# Patient Record
Sex: MALE | Race: WHITE | Employment: FULL TIME | URBAN - METROPOLITAN AREA
[De-identification: names, ages, dates, MRNs, and addresses within clinical notes are randomized per-mention and may not be internally consistent; named-entity substitution may affect disease eponyms.]

---

## 2019-05-20 ENCOUNTER — OFFICE VISIT (OUTPATIENT)
Dept: FAMILY MEDICINE CLINIC | Age: 39
End: 2019-05-20
Payer: COMMERCIAL

## 2019-05-20 VITALS
WEIGHT: 163.8 LBS | BODY MASS INDEX: 26.33 KG/M2 | OXYGEN SATURATION: 98 % | HEIGHT: 66 IN | DIASTOLIC BLOOD PRESSURE: 88 MMHG | SYSTOLIC BLOOD PRESSURE: 134 MMHG | HEART RATE: 84 BPM

## 2019-05-20 DIAGNOSIS — F17.200 TOBACCO DEPENDENCE: ICD-10-CM

## 2019-05-20 DIAGNOSIS — M54.41 ACUTE RIGHT-SIDED LOW BACK PAIN WITH RIGHT-SIDED SCIATICA: Primary | ICD-10-CM

## 2019-05-20 PROCEDURE — 99202 OFFICE O/P NEW SF 15 MIN: CPT | Performed by: NURSE PRACTITIONER

## 2019-05-20 RX ORDER — METHYLPREDNISOLONE 4 MG/1
TABLET ORAL
Qty: 1 KIT | Refills: 0 | Status: SHIPPED | OUTPATIENT
Start: 2019-05-20 | End: 2019-05-26

## 2019-05-20 RX ORDER — AMLODIPINE BESYLATE 10 MG/1
10 TABLET ORAL
COMMUNITY
Start: 2019-04-29

## 2019-05-20 RX ORDER — CYCLOBENZAPRINE HCL 5 MG
5 TABLET ORAL 3 TIMES DAILY PRN
Qty: 20 TABLET | Refills: 0 | Status: SHIPPED | OUTPATIENT
Start: 2019-05-20

## 2019-05-20 RX ORDER — LISINOPRIL 40 MG/1
40 TABLET ORAL
COMMUNITY
Start: 2019-04-29

## 2019-05-20 RX ORDER — NAPROXEN 500 MG/1
500 TABLET ORAL 2 TIMES DAILY WITH MEALS
Qty: 30 TABLET | Refills: 0 | Status: SHIPPED | OUTPATIENT
Start: 2019-05-20 | End: 2019-06-05 | Stop reason: SDUPTHER

## 2019-05-20 SDOH — HEALTH STABILITY: MENTAL HEALTH: HOW MANY STANDARD DRINKS CONTAINING ALCOHOL DO YOU HAVE ON A TYPICAL DAY?: 1 OR 2

## 2019-05-20 SDOH — HEALTH STABILITY: MENTAL HEALTH: HOW OFTEN DO YOU HAVE A DRINK CONTAINING ALCOHOL?: 4 OR MORE TIMES A WEEK

## 2019-05-20 ASSESSMENT — PATIENT HEALTH QUESTIONNAIRE - PHQ9
2. FEELING DOWN, DEPRESSED OR HOPELESS: 0
SUM OF ALL RESPONSES TO PHQ QUESTIONS 1-9: 0
1. LITTLE INTEREST OR PLEASURE IN DOING THINGS: 0
SUM OF ALL RESPONSES TO PHQ QUESTIONS 1-9: 0
SUM OF ALL RESPONSES TO PHQ9 QUESTIONS 1 & 2: 0

## 2019-05-20 ASSESSMENT — ENCOUNTER SYMPTOMS
BOWEL INCONTINENCE: 0
BACK PAIN: 1
ABDOMINAL PAIN: 0

## 2019-05-20 NOTE — PATIENT INSTRUCTIONS
Patient Education        Low Back Pain: Exercises  Your Care Instructions  Here are some examples of typical rehabilitation exercises for your condition. Start each exercise slowly. Ease off the exercise if you start to have pain. Your doctor or physical therapist will tell you when you can start these exercises and which ones will work best for you. How to do the exercises  Press-up    1. Lie on your stomach, supporting your body with your forearms. 2. Press your elbows down into the floor to raise your upper back. As you do this, relax your stomach muscles and allow your back to arch without using your back muscles. As your press up, do not let your hips or pelvis come off the floor. 3. Hold for 15 to 30 seconds, then relax. 4. Repeat 2 to 4 times. Alternate arm and leg (bird dog) exercise    1. Start on the floor, on your hands and knees. 2. Tighten your belly muscles. 3. Raise one leg off the floor, and hold it straight out behind you. Be careful not to let your hip drop down, because that will twist your trunk. 4. Hold for about 6 seconds, then lower your leg and switch to the other leg. 5. Repeat 8 to 12 times on each leg. 6. Over time, work up to holding for 10 to 30 seconds each time. 7. If you feel stable and secure with your leg raised, try raising the opposite arm straight out in front of you at the same time. Knee-to-chest exercise    1. Lie on your back with your knees bent and your feet flat on the floor. 2. Bring one knee to your chest, keeping the other foot flat on the floor (or keeping the other leg straight, whichever feels better on your lower back). 3. Keep your lower back pressed to the floor. Hold for at least 15 to 30 seconds. 4. Relax, and lower the knee to the starting position. 5. Repeat with the other leg. Repeat 2 to 4 times with each leg. 6. To get more stretch, put your other leg flat on the floor while pulling your knee to your chest.    Curl-ups    1.  Lie on the floor on your back with your knees bent at a 90-degree angle. Your feet should be flat on the floor, about 12 inches from your buttocks. 2. Cross your arms over your chest. If this bothers your neck, try putting your hands behind your neck (not your head), with your elbows spread apart. 3. Slowly tighten your belly muscles and raise your shoulder blades off the floor. 4. Keep your head in line with your body, and do not press your chin to your chest.  5. Hold this position for 1 or 2 seconds, then slowly lower yourself back down to the floor. 6. Repeat 8 to 12 times. Pelvic tilt exercise    1. Lie on your back with your knees bent. 2. \"Brace\" your stomach. This means to tighten your muscles by pulling in and imagining your belly button moving toward your spine. You should feel like your back is pressing to the floor and your hips and pelvis are rocking back. 3. Hold for about 6 seconds while you breathe smoothly. 4. Repeat 8 to 12 times. Heel dig bridging    1. Lie on your back with both knees bent and your ankles bent so that only your heels are digging into the floor. Your knees should be bent about 90 degrees. 2. Then push your heels into the floor, squeeze your buttocks, and lift your hips off the floor until your shoulders, hips, and knees are all in a straight line. 3. Hold for about 6 seconds as you continue to breathe normally, and then slowly lower your hips back down to the floor and rest for up to 10 seconds. 4. Do 8 to 12 repetitions. Hamstring stretch in doorway    1. Lie on your back in a doorway, with one leg through the open door. 2. Slide your leg up the wall to straighten your knee. You should feel a gentle stretch down the back of your leg. 3. Hold the stretch for at least 15 to 30 seconds. Do not arch your back, point your toes, or bend either knee. Keep one heel touching the floor and the other heel touching the wall. 4. Repeat with your other leg.   5. Do 2 to 4 times for each leg. Hip flexor stretch    1. Kneel on the floor with one knee bent and one leg behind you. Place your forward knee over your foot. Keep your other knee touching the floor. 2. Slowly push your hips forward until you feel a stretch in the upper thigh of your rear leg. 3. Hold the stretch for at least 15 to 30 seconds. Repeat with your other leg. 4. Do 2 to 4 times on each side. Wall sit    1. Stand with your back 10 to 12 inches away from a wall. 2. Lean into the wall until your back is flat against it. 3. Slowly slide down until your knees are slightly bent, pressing your lower back into the wall. 4. Hold for about 6 seconds, then slide back up the wall. 5. Repeat 8 to 12 times. Follow-up care is a key part of your treatment and safety. Be sure to make and go to all appointments, and call your doctor if you are having problems. It's also a good idea to know your test results and keep a list of the medicines you take. Where can you learn more? Go to https://[employer]List.[employer]. org and sign in to your [employer] account. Enter K862 in the [employer] box to learn more about \"Low Back Pain: Exercises. \"     If you do not have an account, please click on the \"Sign Up Now\" link. Current as of: September 20, 2018  Content Version: 12.0  © 9718-8130 Healthwise, Incorporated. Care instructions adapted under license by [facility] ([facility]). If you have questions about a medical condition or this instruction, always ask your healthcare professional. [address] any warranty or liability for your use of this information. We are committed to providing you the best care possible. If you receive a survey after visiting one of our offices, please take time to share your experience concerning your physician office visit. These surveys are confidential and no health information about you is shared.     We are eager to improve for you and we are counting on your feedback to help make that happen. We are committed to providing you the best care possible. If you receive a survey after visiting one of our offices, please take time to share your experience concerning your physician office visit. These surveys are confidential and no health information about you is shared. We are eager to improve for you and we are counting on your feedback to help make that happen.

## 2019-05-20 NOTE — PROGRESS NOTES
Camille Albert   34 y.o.  male  [id number]      Chief Complaint   Patient presents with    Back Pain     x 3 weeks, radiating down the right leg. The pain is constant, worse with activity. He went to the chiropractor, he did take ibuprofen once and it didn't help. He denies injury, states he woke up with it. Subjective:  38 y.o.male is here for a follow up. He has the following chronic/acute medical problems: There is no problem list on file for this patient. Back Pain   This is a new problem. The current episode started 1 to 4 weeks ago (3 weeks ). The problem occurs constantly. The problem is unchanged. The pain is present in the lumbar spine. The pain radiates to the right thigh. The pain is at a severity of 1/10 (by the end of the day states it will be a 10). Worse during: worse towards the end of the day. Exacerbated by: movement. Pertinent negatives include no abdominal pain, bladder incontinence, bowel incontinence, chest pain, dysuria, fever, headaches, leg pain, numbness, paresis, paresthesias, pelvic pain, perianal numbness, tingling, weakness or weight loss. He has tried chiropractic manipulation for the symptoms. The treatment provided no relief. Review of Systems   Constitutional: Negative for appetite change, chills, fatigue, fever and weight loss. HENT: Negative. Respiratory: Negative. Cardiovascular: Negative for chest pain and palpitations. Gastrointestinal: Negative for abdominal pain, bowel incontinence, diarrhea, nausea and vomiting. Genitourinary: Negative for bladder incontinence, dysuria and pelvic pain. Musculoskeletal: Positive for back pain. Skin: Negative for rash. Neurological: Negative for dizziness, tingling, weakness, light-headedness, numbness, headaches and paresthesias.        Current Outpatient Medications   Medication Sig Dispense Refill    lisinopril (PRINIVIL;ZESTRIL) 40 MG tablet Take 40 mg by mouth      amLODIPine (NORVASC) 10 MG tablet Take 10 mg by mouth      methylPREDNISolone (MEDROL, KLEBER,) 4 MG tablet Take by mouth. 1 kit 0    naproxen (NAPROSYN) 500 MG tablet Take 1 tablet by mouth 2 times daily (with meals) 30 tablet 0    cyclobenzaprine (FLEXERIL) 5 MG tablet Take 1 tablet by mouth 3 times daily as needed for Muscle spasms 20 tablet 0     No current facility-administered medications for this visit. Past medical, family,surgical history reviewed today. Objective:  /88   Pulse 84   Ht 5' 5.6\" (1.666 m)   Wt 163 lb 12.8 oz (74.3 kg)   SpO2 98%   BMI 26.76 kg/m²   BP Readings from Last 3 Encounters:   05/20/19 134/88     Wt Readings from Last 3 Encounters:   05/20/19 163 lb 12.8 oz (74.3 kg)         Physical Exam   Constitutional: He is oriented to person, place, and time. He appears well-developed and well-nourished. HENT:   Head: Normocephalic. Neck: Neck supple. Cardiovascular: Normal rate, regular rhythm and normal heart sounds. Pulmonary/Chest: Effort normal and breath sounds normal.   Musculoskeletal:        Lumbar back: He exhibits pain. Neurological: He is alert and oriented to person, place, and time. Skin: Skin is warm and dry. Psychiatric: He has a normal mood and affect. His behavior is normal.       No results found for: WBC, HGB, HCT, MCV, PLT  No results found for: NA, K, CL, CO2, BUN, CREATININE, GLUCOSE, CALCIUM, PROT, LABALBU, BILITOT, ALKPHOS, AST, ALT, LABGLOM, GFRAA, AGRATIO, GLOB  No results found for: CHOL  No results found for: TRIG  No results found for: HDL  No results found for: LDLCALC, LDLCHOLESTEROL  No results found for: LABA1C  No results found for: TSHFT4, TSH, TSHHS      ASSESSMENT/PLAN:      1. Acute right-sided low back pain with right-sided sciatica  - methylPREDNISolone (MEDROL, KLEBER,) 4 MG tablet; Take by mouth. Dispense: 1 kit; Refill: 0  - naproxen (NAPROSYN) 500 MG tablet; Take 1 tablet by mouth 2 times daily (with meals)  Dispense: 30 tablet;  Refill: 0  -

## 2019-05-22 ASSESSMENT — ENCOUNTER SYMPTOMS
RESPIRATORY NEGATIVE: 1
NAUSEA: 0
VOMITING: 0
DIARRHEA: 0

## 2019-06-05 DIAGNOSIS — M54.41 ACUTE RIGHT-SIDED LOW BACK PAIN WITH RIGHT-SIDED SCIATICA: ICD-10-CM

## 2019-06-05 RX ORDER — NAPROXEN 500 MG/1
TABLET ORAL
Qty: 30 TABLET | Refills: 0 | Status: SHIPPED | OUTPATIENT
Start: 2019-06-05

## 2019-07-15 DIAGNOSIS — M54.41 ACUTE RIGHT-SIDED LOW BACK PAIN WITH RIGHT-SIDED SCIATICA: ICD-10-CM

## 2019-07-15 RX ORDER — NAPROXEN 500 MG/1
TABLET ORAL
Qty: 30 TABLET | Refills: 0 | OUTPATIENT
Start: 2019-07-15

## 2022-03-01 ENCOUNTER — HOSPITAL ENCOUNTER (INPATIENT)
Age: 42
LOS: 1 days | Discharge: LEFT BEFORE DISCHARGE | DRG: 066 | End: 2022-03-01
Attending: INTERNAL MEDICINE
Payer: COMMERCIAL

## 2022-03-01 ENCOUNTER — APPOINTMENT (OUTPATIENT)
Dept: CT IMAGING | Age: 42
DRG: 066 | End: 2022-03-01
Payer: COMMERCIAL

## 2022-03-01 ENCOUNTER — APPOINTMENT (OUTPATIENT)
Dept: MRI IMAGING | Age: 42
DRG: 066 | End: 2022-03-01
Payer: COMMERCIAL

## 2022-03-01 ENCOUNTER — APPOINTMENT (OUTPATIENT)
Dept: GENERAL RADIOLOGY | Age: 42
DRG: 066 | End: 2022-03-01
Payer: COMMERCIAL

## 2022-03-01 VITALS
WEIGHT: 145 LBS | BODY MASS INDEX: 22.76 KG/M2 | OXYGEN SATURATION: 97 % | SYSTOLIC BLOOD PRESSURE: 143 MMHG | HEART RATE: 88 BPM | RESPIRATION RATE: 12 BRPM | TEMPERATURE: 98 F | HEIGHT: 67 IN | DIASTOLIC BLOOD PRESSURE: 95 MMHG

## 2022-03-01 DIAGNOSIS — R27.0 ATAXIA: Primary | ICD-10-CM

## 2022-03-01 DIAGNOSIS — M50.00 DISC DISEASE WITH MYELOPATHY, CERVICAL: ICD-10-CM

## 2022-03-01 DIAGNOSIS — R93.0 ABNORMAL CT OF THE HEAD: ICD-10-CM

## 2022-03-01 PROBLEM — R29.90 STROKE-LIKE SYMPTOMS: Status: ACTIVE | Noted: 2022-03-01

## 2022-03-01 LAB
ALBUMIN SERPL-MCNC: 4.3 GM/DL (ref 3.4–5)
ALP BLD-CCNC: 82 IU/L (ref 40–129)
ALT SERPL-CCNC: 25 U/L (ref 10–40)
ANION GAP SERPL CALCULATED.3IONS-SCNC: 12 MMOL/L (ref 4–16)
APTT: 32.7 SECONDS (ref 25.1–37.1)
AST SERPL-CCNC: 37 IU/L (ref 15–37)
BASOPHILS ABSOLUTE: 0.1 K/CU MM
BASOPHILS RELATIVE PERCENT: 0.5 % (ref 0–1)
BILIRUB SERPL-MCNC: 0.3 MG/DL (ref 0–1)
BUN BLDV-MCNC: 12 MG/DL (ref 6–23)
CALCIUM SERPL-MCNC: 9.8 MG/DL (ref 8.3–10.6)
CHLORIDE BLD-SCNC: 101 MMOL/L (ref 99–110)
CO2: 24 MMOL/L (ref 21–32)
CREAT SERPL-MCNC: 0.9 MG/DL (ref 0.9–1.3)
DIFFERENTIAL TYPE: ABNORMAL
EKG ATRIAL RATE: 86 BPM
EKG DIAGNOSIS: NORMAL
EKG P AXIS: 50 DEGREES
EKG P-R INTERVAL: 120 MS
EKG Q-T INTERVAL: 346 MS
EKG QRS DURATION: 86 MS
EKG QTC CALCULATION (BAZETT): 414 MS
EKG R AXIS: 33 DEGREES
EKG T AXIS: 42 DEGREES
EKG VENTRICULAR RATE: 86 BPM
EOSINOPHILS ABSOLUTE: 0.1 K/CU MM
EOSINOPHILS RELATIVE PERCENT: 0.9 % (ref 0–3)
GFR AFRICAN AMERICAN: >60 ML/MIN/1.73M2
GFR NON-AFRICAN AMERICAN: >60 ML/MIN/1.73M2
GLUCOSE BLD-MCNC: 98 MG/DL (ref 70–99)
HCT VFR BLD CALC: 46.4 % (ref 42–52)
HEMOGLOBIN: 15.1 GM/DL (ref 13.5–18)
IMMATURE NEUTROPHIL %: 0.5 % (ref 0–0.43)
INR BLD: 0.85 INDEX
LV EF: 55 %
LVEF MODALITY: NORMAL
LYMPHOCYTES ABSOLUTE: 2.2 K/CU MM
LYMPHOCYTES RELATIVE PERCENT: 20 % (ref 24–44)
MCH RBC QN AUTO: 32.1 PG (ref 27–31)
MCHC RBC AUTO-ENTMCNC: 32.5 % (ref 32–36)
MCV RBC AUTO: 98.5 FL (ref 78–100)
MONOCYTES ABSOLUTE: 1.1 K/CU MM
MONOCYTES RELATIVE PERCENT: 9.7 % (ref 0–4)
NUCLEATED RBC %: 0 %
PDW BLD-RTO: 13.3 % (ref 11.7–14.9)
PLATELET # BLD: 332 K/CU MM (ref 140–440)
PMV BLD AUTO: 8.1 FL (ref 7.5–11.1)
POTASSIUM SERPL-SCNC: 4.8 MMOL/L (ref 3.5–5.1)
PROTHROMBIN TIME: 11 SECONDS (ref 11.7–14.5)
RBC # BLD: 4.71 M/CU MM (ref 4.6–6.2)
SEGMENTED NEUTROPHILS ABSOLUTE COUNT: 7.4 K/CU MM
SEGMENTED NEUTROPHILS RELATIVE PERCENT: 68.4 % (ref 36–66)
SODIUM BLD-SCNC: 137 MMOL/L (ref 135–145)
TOTAL IMMATURE NEUTOROPHIL: 0.05 K/CU MM
TOTAL NUCLEATED RBC: 0 K/CU MM
TOTAL PROTEIN: 7.4 GM/DL (ref 6.4–8.2)
WBC # BLD: 10.8 K/CU MM (ref 4–10.5)

## 2022-03-01 PROCEDURE — 6370000000 HC RX 637 (ALT 250 FOR IP): Performed by: NURSE PRACTITIONER

## 2022-03-01 PROCEDURE — 85025 COMPLETE CBC W/AUTO DIFF WBC: CPT

## 2022-03-01 PROCEDURE — 1200000000 HC SEMI PRIVATE

## 2022-03-01 PROCEDURE — 70498 CT ANGIOGRAPHY NECK: CPT

## 2022-03-01 PROCEDURE — 85730 THROMBOPLASTIN TIME PARTIAL: CPT

## 2022-03-01 PROCEDURE — 93010 ELECTROCARDIOGRAM REPORT: CPT | Performed by: INTERNAL MEDICINE

## 2022-03-01 PROCEDURE — 99284 EMERGENCY DEPT VISIT MOD MDM: CPT

## 2022-03-01 PROCEDURE — 84484 ASSAY OF TROPONIN QUANT: CPT

## 2022-03-01 PROCEDURE — 71045 X-RAY EXAM CHEST 1 VIEW: CPT

## 2022-03-01 PROCEDURE — 92610 EVALUATE SWALLOWING FUNCTION: CPT

## 2022-03-01 PROCEDURE — 99222 1ST HOSP IP/OBS MODERATE 55: CPT | Performed by: PHYSICIAN ASSISTANT

## 2022-03-01 PROCEDURE — 6360000004 HC RX CONTRAST MEDICATION: Performed by: PHYSICIAN ASSISTANT

## 2022-03-01 PROCEDURE — 70450 CT HEAD/BRAIN W/O DYE: CPT

## 2022-03-01 PROCEDURE — 85610 PROTHROMBIN TIME: CPT

## 2022-03-01 PROCEDURE — 93306 TTE W/DOPPLER COMPLETE: CPT

## 2022-03-01 PROCEDURE — 93005 ELECTROCARDIOGRAM TRACING: CPT | Performed by: PHYSICIAN ASSISTANT

## 2022-03-01 PROCEDURE — 99254 IP/OBS CNSLTJ NEW/EST MOD 60: CPT

## 2022-03-01 PROCEDURE — 80053 COMPREHEN METABOLIC PANEL: CPT

## 2022-03-01 RX ORDER — ACETAMINOPHEN 650 MG/1
650 SUPPOSITORY RECTAL EVERY 4 HOURS PRN
Status: DISCONTINUED | OUTPATIENT
Start: 2022-03-01 | End: 2022-03-01 | Stop reason: HOSPADM

## 2022-03-01 RX ORDER — LABETALOL HYDROCHLORIDE 5 MG/ML
10 INJECTION, SOLUTION INTRAVENOUS EVERY 10 MIN PRN
Status: DISCONTINUED | OUTPATIENT
Start: 2022-03-01 | End: 2022-03-01 | Stop reason: HOSPADM

## 2022-03-01 RX ORDER — ALBUTEROL SULFATE 90 UG/1
2 AEROSOL, METERED RESPIRATORY (INHALATION) EVERY 6 HOURS PRN
COMMUNITY
Start: 2022-01-24

## 2022-03-01 RX ORDER — LISINOPRIL 20 MG/1
40 TABLET ORAL DAILY
Status: DISCONTINUED | OUTPATIENT
Start: 2022-03-01 | End: 2022-03-01 | Stop reason: HOSPADM

## 2022-03-01 RX ORDER — ACETAMINOPHEN 325 MG/1
650 TABLET ORAL EVERY 4 HOURS PRN
Status: DISCONTINUED | OUTPATIENT
Start: 2022-03-01 | End: 2022-03-01 | Stop reason: HOSPADM

## 2022-03-01 RX ORDER — ASPIRIN 300 MG/1
300 SUPPOSITORY RECTAL DAILY
Status: DISCONTINUED | OUTPATIENT
Start: 2022-03-01 | End: 2022-03-01 | Stop reason: HOSPADM

## 2022-03-01 RX ORDER — ONDANSETRON 2 MG/ML
4 INJECTION INTRAMUSCULAR; INTRAVENOUS EVERY 6 HOURS PRN
Status: DISCONTINUED | OUTPATIENT
Start: 2022-03-01 | End: 2022-03-01 | Stop reason: HOSPADM

## 2022-03-01 RX ORDER — SODIUM CHLORIDE 9 MG/ML
25 INJECTION, SOLUTION INTRAVENOUS PRN
Status: DISCONTINUED | OUTPATIENT
Start: 2022-03-01 | End: 2022-03-01 | Stop reason: HOSPADM

## 2022-03-01 RX ORDER — ONDANSETRON 4 MG/1
4 TABLET, ORALLY DISINTEGRATING ORAL EVERY 8 HOURS PRN
Status: DISCONTINUED | OUTPATIENT
Start: 2022-03-01 | End: 2022-03-01 | Stop reason: HOSPADM

## 2022-03-01 RX ORDER — AMLODIPINE BESYLATE 5 MG/1
10 TABLET ORAL DAILY
Status: DISCONTINUED | OUTPATIENT
Start: 2022-03-01 | End: 2022-03-01 | Stop reason: HOSPADM

## 2022-03-01 RX ORDER — FAMOTIDINE 20 MG/1
20 TABLET, FILM COATED ORAL 2 TIMES DAILY
Status: DISCONTINUED | OUTPATIENT
Start: 2022-03-01 | End: 2022-03-01 | Stop reason: HOSPADM

## 2022-03-01 RX ORDER — ASPIRIN 81 MG/1
81 TABLET ORAL DAILY
Status: DISCONTINUED | OUTPATIENT
Start: 2022-03-01 | End: 2022-03-01 | Stop reason: HOSPADM

## 2022-03-01 RX ORDER — ATORVASTATIN CALCIUM 40 MG/1
80 TABLET, FILM COATED ORAL NIGHTLY
Status: DISCONTINUED | OUTPATIENT
Start: 2022-03-01 | End: 2022-03-01 | Stop reason: HOSPADM

## 2022-03-01 RX ORDER — ALBUTEROL SULFATE 90 UG/1
2 AEROSOL, METERED RESPIRATORY (INHALATION) EVERY 6 HOURS PRN
Status: DISCONTINUED | OUTPATIENT
Start: 2022-03-01 | End: 2022-03-01 | Stop reason: HOSPADM

## 2022-03-01 RX ORDER — SODIUM CHLORIDE 0.9 % (FLUSH) 0.9 %
5-40 SYRINGE (ML) INJECTION PRN
Status: DISCONTINUED | OUTPATIENT
Start: 2022-03-01 | End: 2022-03-01 | Stop reason: HOSPADM

## 2022-03-01 RX ORDER — SODIUM CHLORIDE 0.9 % (FLUSH) 0.9 %
5-40 SYRINGE (ML) INJECTION EVERY 12 HOURS SCHEDULED
Status: DISCONTINUED | OUTPATIENT
Start: 2022-03-01 | End: 2022-03-01 | Stop reason: HOSPADM

## 2022-03-01 RX ADMIN — FAMOTIDINE 20 MG: 20 TABLET, FILM COATED ORAL at 15:32

## 2022-03-01 RX ADMIN — ASPIRIN 81 MG: 81 TABLET, COATED ORAL at 15:32

## 2022-03-01 RX ADMIN — IOPAMIDOL 80 ML: 755 INJECTION, SOLUTION INTRAVENOUS at 14:04

## 2022-03-01 ASSESSMENT — ENCOUNTER SYMPTOMS
VOMITING: 0
SHORTNESS OF BREATH: 0
NAUSEA: 0
BACK PAIN: 1
ALLERGIC/IMMUNOLOGIC NEGATIVE: 1
CHEST TIGHTNESS: 0
EYES NEGATIVE: 1

## 2022-03-01 NOTE — CONSULTS
Neurology Service Consult Note  [facility]  Patient Name: Janie Sandoval  : 1980        Subjective:   Reason for consult: Ataxia, RMCA hyperdense on head CT  Patient seen and examined. Chart reviewed in detail. 35 y.o. -male with PMH of HTN, back pain,  presenting to [facility] from [address] for worsening back pain, and worsening right lower extremity weakness. Patient reportedly had a MRI of lumbar spine one week ago and has had pain in his RLE since. Patient further reports unsteady gait for approximately 2 years. Jon smokes a ppd for 28 years. Upon walking into the room to examine patient, patient yells out and states that he is frustrated with all the testing inform the patient that a neurology consult has been placed due to the findings on his head CT. Patient states that he does not want to have an MRI of brain, despite of explanation of the urgency for this test.  Patient further states that he came in for his leg weakness and to receive MRI of his back for leg weakness. He further reports that he did not come in for a scan of his head. His acute stroke findings on his CT were explained as an incidental finding as he has not been symptomatic he states. Nonetheless attempts were made to calm patient's frustration, without success at times. Exam is limited secondary to patient's cooperation. Patient did vocalize that he has been having difficulty with his gait for 2 years, in which he sometimes drags his right leg the longer that he stands on it. On exam I also appreciated a left facial droop, however patient states that he has had this for a long time. Patient further reports that he is uninterested in an MRI of his brain and is ready to go home. I strongly encourage patient to stay to get further testing as equally beneficial in in the long-term.     No past medical history on file. :   No past surgical history on file.  Medications:  Scheduled Meds:   sodium chloride flush  5-40 mL IntraVENous 2 times per day    aspirin  81 mg Oral Daily    Or    aspirin  300 mg Rectal Daily    bisacodyl  5 mg Oral Daily    atorvastatin  80 mg Oral Nightly    [Held by provider] lisinopril  40 mg Oral Daily    [Held by provider] amLODIPine  10 mg Oral Daily    famotidine  20 mg Oral BID     Continuous Infusions:   sodium chloride       PRN Meds:.albuterol sulfate HFA, sodium chloride flush, sodium chloride, acetaminophen **OR** acetaminophen, ondansetron **OR** ondansetron, labetalol    No Known Allergies  Social History     Socioeconomic History    Marital status: Single     Spouse name: Not on file    Number of children: Not on file    Years of education: Not on file    Highest education level: Not on file   Occupational History    Not on file   Tobacco Use    Smoking status: Current Every Day Smoker     Packs/day: 1.00     Types: Cigarettes     Start date: 1/1/1994    Smokeless tobacco: Never Used   Vaping Use    Vaping Use: Never used   Substance and Sexual Activity    Alcohol use: Yes    Drug use: Never    Sexual activity: Yes   Other Topics Concern    Not on file   Social History Narrative    Not on file     Social Determinants of Health     Financial Resource Strain:     Difficulty of Paying Living Expenses: Not on file   Food Insecurity:     Worried About Running Out of Food in the Last Year: Not on file    Marie of Food in the Last Year: Not on file   Transportation Needs:     Lack of Transportation (Medical): Not on file    Lack of Transportation (Non-Medical):  Not on file   Physical Activity:     Days of Exercise per Week: Not on file    Minutes of Exercise per Session: Not on file   Stress:     Feeling of Stress : Not on file   Social Connections:     Frequency of Communication with Friends and Family: Not on file    Frequency of Social Gatherings with Friends and Family: Not on file    Attends location, month and year, NAD, speech clear, language fluent, repetition and naming intact, follows commands appropriately    Cranial Nerve Exam:   CN II-XII: PERRL, VFF, no nystagmus, no gaze paresis, sensation V1-V3 intact b/l, muscles of facial expression symmetric; hearing intact to conversational tone, palate elevates symmetrically, shoulder elevation symmetric and tongue protrudes midline with movement side to side. Motor Exam:       Strength antigravity x4  Tone and bulk normal   No pronator drift    Deep Tendon Reflexes: Unable to exam  Sensation: Antigravity x4    Coordination/Cerebellum:       Tremors--none      Rapidly alternating movements: no dysdiadochokinesia b/l                Heel-to-Shin: CARIN    Finger-to-Nose: CARIN    Gait and stance:      Gait: deferred      LABS:        CBC:   Recent Labs     03/01/22  1117   WBC 10.8*   RBC 4.71   HGB 15.1   HCT 46.4      MCV 98.5     BMP:    Recent Labs     03/01/22  1117      K 4.8      CO2 24   BUN 12   CREATININE 0.9   GLUCOSE 98   CALCIUM 9.8       IMAGING:    CTH      Impression   Possible hyperdense right MCA sign, correlation for symptoms of right-sided   ischemia suggested.  No hemorrhage identified.  MRI may be of benefit to   further evaluate.       CTA head/neck      Impression   1. Atherosclerosis contributes to approximately 50% stenosis of the proximal   right cervical ICA by NASCET criteria. 2. No focal stenosis otherwise seen of the cervical carotid/vertebral   arteries. 3. No significant stenosis of the [id number]-Catbkh. 4. A 1-2 mm outpouching is seen arising from the right supraclinoid ICA,   which may represent an infundibulum versus a tiny aneurysm. Above imaging personally reviewed in detail. ASSESSMENT/PLAN:     35 y.o. -male with PMH of HTN, back pain,  [facility] and worsening right lower extremity weakness.  Neurology being consulted for abnormal CTH, showing hyper density to RMCA. Exam was limited secondary to patient's cooperation. Patient was uninterested in MRI, and declared his frustrations regarding ultimate test, which he states he only wanted an MRI of his back secondary to leg pain. Attempts made to educate on stroke risk factors, and medication regimen such as aspirin and statin for secondary stroke prevention. 1. Ataxic gait and right lower extremity weakness possibly due to radiculopathy v.  Myelopathy v.  Nerve impingement  2. Abnormal CTH shows hyperdense right MCA  Stroke prevention:  aspirin, statin    Neurodiagnostics  --CT as above  --CTA of head/ neck as above  --MRI brain pending-patient refusing     Patient discussed with attending physician Dr. John Ureña    Thank you for allowing us to participate in the care of your patient. If there are any questions regarding evaluation please feel free to contact us.      (Please note that portions of this note were completed with a voice recognition program.  Efforts were made to edit the dictations but occasionally words are mistranscribed.)      ANGIE Cote - CNP, 3/1/2022

## 2022-03-01 NOTE — ED PROVIDER NOTES
EKG  86 bpm, normal intervals. No ST elevation. Normal sinus rhythm.   No previous to compare      Saira Valadez MD  03/01/22 3851

## 2022-03-01 NOTE — CONSULTS
Neurosurgery   Consult Note      Reason for Consult: Concern for myelopathy  Consulting Physician: KYREE Morgan  Attending Physician:  Abbe Sorto MD  Date of Admission: 3/1/2022  Subjective:   CHIEF COMPLAINT: Unsteady gait, right leg pain    HPI:  35 y.o. 1980  Who presented to the ED 3/1/22 after being seen at Dr. Kayla Leahy outpatient office. He presented to follow-up on the lumbar MRI that was obtained to plaints of back pain and progressively worsening right leg pain. He complained of ataxia that has worsened over the past several months as well. He states he initially noticed pain. Unsteady and off balance roughly 2 years ago while he was at work. 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 He states that he could not do that now. He denies any significant neck or mid back pain, does complain of low back pain with radiation down his right leg. This pain worsens with activity and at night. Denies any numbness/tingling in his lower extremities but does state that his left arm will go numb at times, mainly at night when he is sleeping. He admits to weakness in his right leg. He denies any saddle paresthesias or bowel/bladder incontinence. A CT of the head was obtained by the ED provider given patient's complaints of ataxia. It suggests a hyperdense right MCA sign suggestive of right MCA stroke. He denies any significant headaches, vision changes. His speech is clear and coherent when he talks with me. Patient is not on any antiplatelets or anticoagulants. PMHx positive for hypertension. No past surgical listed in chart. Past Medical and Surgical History:   No past medical history on file. No past surgical history on file. Social History:    TOBACCO:   reports that he has been smoking cigarettes. He started smoking about 28 years ago.  He has been smoking about 1.00 pack per day. He has never used smokeless tobacco.  ETOH:   reports current alcohol use. Family History:   No family history on file. Current Medications:    No current facility-administered medications for this encounter. No Known Allergies     REVIEW OF SYSTEMS:    CONSTITUTIONAL:  negative for fevers, chills, diaphoresis, activity change, appetite change, fatigue, night sweats and unexpected weight change. EYES:  negative for blurred vision, eye discharge, visual disturbance and icterus  HEENT:  negative for hearing loss, tinnitus, ear drainage, sinus pressure, nasal congestion  RESPIRATORY:  No cough, shortness of breath, hemoptysis  GASTROINTESTINAL:  negative for nausea, vomiting, diarrhea, constipation, blood in stool and abdominal pain  GENITOURINARY:  negative for frequency, dysuria, urinary incontinence, decreased urine volume, and hematuria  HEMATOLOGIC/LYMPHATIC:  negative for easy bruising, bleeding and lymphadenopathy  MUSCULOSKELETAL:  Positive for leg pain and back pain, negative for joint swelling, decreased range of motion and muscle weakness  NEUROLOGICAL:  negative for headaches, slurred speech, unilateral weakness  PSYCHIATRIC/BEHAVIORAL: negative for hallucinations, behavioral problems, confusion and agitation. Objective:   PHYSICAL EXAM:      VITALS:  BP (!) 141/95   Pulse 98   Temp 98 °F (36.7 °C) (Oral)   Resp 18   Ht 5' 7\" (1.702 m)   Wt 145 lb (65.8 kg)   SpO2 99%   BMI 22.71 kg/m²      24HR INTAKE/OUTPUT:  No intake or output data in the 24 hours ending 03/01/22 1157  CONSTITUTIONAL:  Awake, alert, cooperative, no apparent distress, and appears stated age  HEENT: NCAT, PERRL, EOMI equal and reactive at 4 mm bilaterally,  tongue protrudes midline  PSYCHIATRIC: Oriented to person place and time. No obvious depression or anxiety. MUSCULOSKELETAL: No obvious misalignment or effusion of the joints. No clubbing, cyanosis of the digits.   SKIN:  normal skin color, texture, turgor and no redness, warmth, or swelling. 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 Upper extremity strength: Bilateral upper extremities 5/5 throughout except for left interosseous which is 4/5, negative Radha's bilaterally  Lower extremity strength: Bilateral lower extremities 5/5 throughout, left patellar DTR 3+, no clonus detected    DATA:    Old records have been reviewed    CBC:  Recent Labs     03/01/22  1117   WBC 10.8*   RBC 4.71   HGB 15.1   HCT 46.4      MCV 98.5   MCH 32.1*   MCHC 32.5   RDW 13.3   SEGSPCT 68.4*      BMP:  No results for input(s): NA, K, CL, CO2, BUN, CREATININE, CALCIUM, GLUCOSE in the last 72 hours. Radiology Review:  All pertinent images / reports were reviewed as a part of this visit. Ct head 3/1/22  Impression   Possible hyperdense right MCA sign, correlation for symptoms of right-sided   ischemia suggested.  No hemorrhage identified.  MRI may be of benefit to   further evaluate.       Findings communicated immediately to the referring clinical service.           Assessment:   Potential right MCA stroke  Concern for cervical vs thoracic myelopathy  HTN  Plan: This patient presented to the ER under the advisement of Dr. Sheron Dye office after being seen and evaluated concerning the lumbar MRI. Patient began to complain of symptoms such as ataxia and had hyperreflexia on exam that was concerning for cervical versus thoracic myelopathy. Patient was sent here to obtain a cervical and thoracic MRI. ED provider ordered a CT head that showed a possible hyperdense right MCA sign. CTA of the head and neck is pending.   A brain MRI as well as a cervical and thoracic MRI have been ordered. Hospitalist is admitting, appreciate their assistance. Neurology will be consulted. Electronically signed by Marge Arenas PA-C on 3/1/2022 at 11:57 AM    Supervising physician: Jenniffer Florez MD.  Dr. Venkata New was readily and continuously available by phone for direct consultation regarding the care of this patient. Time spent with patient in consultation, education, and collaboration with medical time is >50% of total time spent on case, including time spent in chart review and dictation. Total time spent: 40 minutes    Thank you for the opportunity to participate in the care of your patient.

## 2022-03-01 NOTE — ED PROVIDER NOTES
EMERGENCY DEPARTMENT ENCOUNTER      PCP: ANGIE Espinal CNP    CHIEF COMPLAINT    Chief Complaint   Patient presents with    Back Pain     Sent by Dr. Jhonathan Collins for an MRI due to a pinch nerve       Of note, this patient was not evaluated by attending physician, attending physician was available for consultation. HPI    Alba Balbuena is a 35 y.o. male who presents to the emergency department today sent in by orthopedic surgery for concern of ataxia/upper motor neuron issue associated with myelopathic of the neck and third thoracic spine. Patient has been following Dr. Jhonathan Collins as an outpatient, was advised to come the emergency department today because he has been having ongoing neck pain, states that he has been off balance. He otherwise been at his normal state of health today. No new headache, no weakness to the face upper or lower extremity. No chest pain or shortness of breath, no abdominal pain. REVIEW OF SYSTEMS    Constitutional:  Denies fever, chills, weight loss or weakness   HENT:  Denies sore throat or ear pain   Cardiovascular:  Denies chest pain, palpitations   Respiratory:  Denies cough or shortness of breath    GI:  Denies abdominal pain, nausea, vomiting, or diarrhea  :  Denies any urinary symptoms . Musculoskeletal: See HPI  Skin:  Denies rash  Neurologic: See HPI denies headache, focal weakness or sensory changes   Endocrine:  Denies polyuria or polydypsia   Lymphatic:  Denies swollen glands     All other review of systems are negative  See HPI and nursing notes for additional information     PAST MEDICAL AND SURGICAL HISTORY    No past medical history on file. No past surgical history on file.     CURRENT MEDICATIONS    Current Outpatient Rx   Medication Sig Dispense Refill    albuterol sulfate  (90 Base) MCG/ACT inhaler Inhale 2 puffs into the lungs every 6 hours as needed      naproxen (NAPROSYN) 500 MG tablet TAKE 1 TABLET BY MOUTH TWICE A DAY WITH MEALS 30 tablet 0    lisinopril (PRINIVIL;ZESTRIL) 40 MG tablet Take 40 mg by mouth      amLODIPine (NORVASC) 10 MG tablet Take 10 mg by mouth      cyclobenzaprine (FLEXERIL) 5 MG tablet Take 1 tablet by mouth 3 times daily as needed for Muscle spasms 20 tablet 0       ALLERGIES    No Known Allergies    SOCIAL AND FAMILY HISTORY    Social History     Socioeconomic History    Marital status: Single     Spouse name: Not on file    Number of children: Not on file    Years of education: Not on file    Highest education level: Not on file   Occupational History    Not on file   Tobacco Use    Smoking status: Current Every Day Smoker     Packs/day: 1.00     Types: Cigarettes     Start date: 1/1/1994    Smokeless tobacco: Never Used   Vaping Use    Vaping Use: Never used   Substance and Sexual Activity    Alcohol use: Yes    Drug use: Never    Sexual activity: Yes   Other Topics Concern    Not on file   Social History Narrative    Not on file     Social Determinants of Health     Financial Resource Strain:     Difficulty of Paying Living Expenses: Not on file   Food Insecurity:     Worried About Running Out of Food in the Last Year: Not on file    Marie of Food in the Last Year: Not on file   Transportation Needs:     Lack of Transportation (Medical): Not on file    Lack of Transportation (Non-Medical):  Not on file   Physical Activity:     Days of Exercise per Week: Not on file    Minutes of Exercise per Session: Not on file   Stress:     Feeling of Stress : Not on file   Social Connections:     Frequency of Communication with Friends and Family: Not on file    Frequency of Social Gatherings with Friends and Family: Not on file    Attends Sikh Services: Not on file    Active Member of Clubs or Organizations: Not on file    Attends Club or Organization Meetings: Not on file    Marital Status: Not on file   Intimate Partner Violence:     Fear of Current or Ex-Partner: Not on file   [employer] Abused: Not on file    Physically Abused: Not on file    Sexually Abused: Not on file   Housing Stability:     Unable to Pay for Housing in the Last Year: Not on file    Number of Places Lived in the Last Year: Not on file    Unstable Housing in the Last Year: Not on file     No family history on file. PHYSICAL EXAM    VITAL SIGNS: BP (!) 141/95   Pulse 98   Temp 98 °F (36.7 °C) (Oral)   Resp 18   Ht 5' 7\" (1.702 m)   Wt 145 lb (65.8 kg)   SpO2 99%   BMI 22.71 kg/m²    Constitutional:  Well developed, Well nourished. No distress  HENT:  Normocephalic, Atraumatic, PERRL. EOMI. Sclera clear. Conjunctiva normal, No discharge. Mild cervical midline tenderness, no palpable swelling or discoloration. Neck/Lymphatics: supple, no JVD, no swollen nodes  Cardiovascular:   RRR,  no murmurs/rubs/gallops. Respiratory:  Nonlabored breathing. Normal breath sounds, No wheezing  Abdomen: Bowel sounds normal, Soft, No tenderness, no masses. Musculoskeletal:    There is no edema, asymmetry, or calf / thigh tenderness bilaterally. No cyanosis. No cool or pale-appearing limb. Distal cap refill and pulses intact bilateral upper and lower extremities  Bilateral upper and lower extremity ROM intact without pain or obvious deficit  Integument:  Warm, Dry  Neurologic: Alert & oriented , No focal deficits noted. Cranial nerves II through XII grossly intact. Normal gross motor coordination & motor strength bilateral upper and lower extremities  Sensation intact.   Psychiatric:  Affect normal, Mood normal.   ----------------------------------------------------------------------------------------------------------------------      NIH Stroke Scale  (Total score at bottom)      (1A) LOC  (Alert?):  0 - alert; keenly responsive    (1B) LOC Questions (Month / Age):  0 - answers both questions correctly     (1C) LOC Command  (Close eyes, squeeze my hands):  0 - performs both tasks correctly    (2) Best Gaze  (Eyes open-patient follows finger or face):  0 - normal    (3) Visual  (Introduce visual stimulus to patient's visual field quadrants):  0 - no visual loss    (4)  Facial palsy  (Show teeth, raise eyebrows and squeeze eyes shut):  0 - normal symmetric movement    (5) Motor arms  (Elevate extremity to 90° in sitting or 45° if supine -  score drift/movement)       (5A)  motor arm LEFT :  0 - no drift, limb holds 90 (or 45) degrees for full 10 seconds       (5B) motor arm RIGHT:   0 - no drift, limb holds 90 (or 45) degrees for full 10 seconds      (6) Motor legs  (Elevate extremity to 30° while supine and score drift/movement)       (6A)  motor leg LEFT:   0 - no drift; leg holds 30 degree position for full 5 seconds       (6B) motor leg RIGHT:  0 - no drift; leg holds 30 degree position for full 5 seconds      (7)  Limb Ataxia  (Finger-nose, heel-shin):  0 - absent    (8) Sensory  (face, arms trunk, and legs-compare side to side):  0 - normal; no sensory loss    (9)  Best language  (Name items, describes a picture, read sentence-see attached testing cards):  0 - no aphasia, normal    (10)  Dysarthria  (Evaluate speech clarity by patient repeating listed words):  0 - normal    (11)  Extinction and inattention  (can feel \"left, right, or both sides\" of face, arms, legs w/ closed eyes) (can see moving index finger in \"left, right, or both\":  0 - no abnormality    Total NIHSS:  0    ----------------------------------------------------------------------------------------------------------------------    Labs:  Results for orders placed or performed during the hospital encounter of 03/01/22   CBC with Auto Differential   Result Value Ref Range    WBC 10.8 (H) 4.0 - 10.5 K/CU MM    RBC 4.71 4.6 - 6.2 M/CU MM    Hemoglobin 15.1 13.5 - 18.0 GM/DL    Hematocrit 46.4 42 - 52 %    MCV 98.5 78 - 100 FL    MCH 32.1 (H) 27 - 31 PG    MCHC 32.5 32.0 - 36.0 %    RDW 13.3 11.7 - 14.9 %    Platelets 491 027 - 250 K/CU MM    MPV 8.1 7.5 - 11.1 FL    Differential Type AUTOMATED DIFFERENTIAL     Segs Relative 68.4 (H) 36 - 66 %    Lymphocytes % 20.0 (L) 24 - 44 %    Monocytes % 9.7 (H) 0 - 4 %    Eosinophils % 0.9 0 - 3 %    Basophils % 0.5 0 - 1 %    Segs Absolute 7.4 K/CU MM    Lymphocytes Absolute 2.2 K/CU MM    Monocytes Absolute 1.1 K/CU MM    Eosinophils Absolute 0.1 K/CU MM    Basophils Absolute 0.1 K/CU MM    Nucleated RBC % 0.0 %    Total Nucleated RBC 0.0 K/CU MM    Total Immature Neutrophil 0.05 K/CU MM    Immature Neutrophil % 0.5 (H) 0 - 0.43 %   Comprehensive Metabolic Panel   Result Value Ref Range    Sodium 137 135 - 145 MMOL/L    Potassium 4.8 3.5 - 5.1 MMOL/L    Chloride 101 99 - 110 mMol/L    CO2 24 21 - 32 MMOL/L    BUN 12 6 - 23 MG/DL    CREATININE 0.9 0.9 - 1.3 MG/DL    Glucose 98 70 - 99 MG/DL    Calcium 9.8 8.3 - 10.6 MG/DL    Albumin 4.3 3.4 - 5.0 GM/DL    Total Protein 7.4 6.4 - 8.2 GM/DL    Total Bilirubin 0.3 0.0 - 1.0 MG/DL    ALT 25 10 - 40 U/L    AST 37 15 - 37 IU/L    Alkaline Phosphatase 82 40 - 129 IU/L    GFR Non-African American >60 >60 mL/min/1.73m2    GFR African American >60 >60 mL/min/1.73m2    Anion Gap 12 4 - 16   PTT   Result Value Ref Range    aPTT 32.7 25.1 - 37.1 SECONDS   Protime-INR   Result Value Ref Range    Protime 11.0 (L) 11.7 - 14.5 SECONDS    INR 0.85 INDEX   EKG 12 Lead   Result Value Ref Range    Ventricular Rate 86 BPM    Atrial Rate 86 BPM    P-R Interval 120 ms    QRS Duration 86 ms    Q-T Interval 346 ms    QTc Calculation (Bazett) 414 ms    P Axis 50 degrees    R Axis 33 degrees    T Axis 42 degrees    Diagnosis       Normal sinus rhythm  Normal ECG  No previous ECGs available         EKG    See supervisingy physicians note for EKG interpretation.     RADIOLOGY    Echocardiogram complete 2D with doppler with color    Result Date: 3/1/2022  Transthoracic Echocardiography Report (TTE)  Demographics   Patient Name       ELMER Penaloza     Date of Study       03/01/2022   Date of Birth 1980         Gender              Male   Age                35 year(s)         Race                   Patient Number     [id number]         Room Number         EY04   Visit Number       [id number]   Corporate ID       [id number]   Accession Number   [id number]          Manuela Gonsales ISAAC   Ordering Physician Michael Baeza Interpreting        Jennifer Jessica                     CNP                Physician           MD  Procedure Type of Study   TTE procedure:ECHOCARDIOGRAM COMPLETE 2D W DOPPLER W COLOR. Procedure Date Date: 03/01/2022 Start: 01:21 PM Study Location: Portable Technical Quality: Adequate visualization Indications:TIA. Patient Status: Routine Height: 67 inches Weight: 145 pounds BSA: 1.76 m2 BMI: 22.71 kg/m2 HR: 71 bpm BP: 141/95 mmHg  Conclusions   Summary  Left ventricular systolic function is normal.  Ejection fraction is visually estimated at 55%. Negative bubble study; no evidence of PFO or ASD. No evidence of any pericardial effusion. Signature   ------------------------------------------------------------------  Electronically signed by Jennifer Jessica MD (Interpreting  physician) on 03/01/2022 at 02:06 PM  ------------------------------------------------------------------   Findings   Left Ventricle  Left ventricular systolic function is normal.  Ejection fraction is visually estimated at 55%. No regional wall motion abnormalites. Left ventricle size is normal.  Normal diastolic function. Left Atrium  Negative bubble study; no evidence of PFO or ASD. Right Atrium  Essentially normal right atrium. Right Ventricle  Essentially normal right ventricle. Aortic Valve  Structurally normal aortic valve. Mitral Valve  Structurally normal mitral valve. Tricuspid Valve  Structurally normal tricuspid valve. Pulmonic Valve  The pulmonic valve was not well visualized.    Pericardial Effusion  No evidence of any pericardial effusion. Pleural Effusion  No evidence of pleural effusion. Miscellaneous  IVC and abdominal aorta are within normal limits.   M-Mode/2D Measurements & Calculations   LV Diastolic Dimension:  LV Systolic Dimension:  LA Dimension: 3.4 cmAO Root  4.1 cm                   3.09 cm                 Dimension: 3.8 cmLA Area:  LV FS:24.6 %             LV Volume Diastolic: 83 06.1 cm2  LV PW Diastolic: 2.66 cm ml  LV PW Systolic: 5.36 cm  LV Volume Systolic: 43  Septum Diastolic: 1.05   ml  cm                       LV EDV/LV EDV Index: 83 RV Diastolic Dimension:  Septum Systolic: 4.23 cm SP/43 F0FC ESV/LV ESV   2.29 cm  CO: 6.23 l/min           Index: 43 ml/24 m2  CI: 3.54 l/m*m2          EF Calculated (A4C):    LA/Aorta: 0.89                           48.2 %  LV Area Diastolic: 80.0  EF Calculated (2D):     LA volume/Index: 22 ml  cm2                      49.3 %                  /03T7  LV Area Systolic: 53.7  cm2                      LV Length: 8.58 cm                            LVOT: 2.4 cm  Doppler Measurements & Calculations   MV Peak E-Wave: 85.4 AV Peak Velocity: 101 cm/s    LVOT Peak Velocity: 92.5  cm/s                 AV Peak Gradient: 4.08 mmHg   cm/s  MV Peak A-Wave: 76   AV Mean Velocity: 72.3 cm/s   LVOT Mean Velocity: 59.7  cm/s                 AV Mean Gradient: 2 mmHg      cm/s  MV E/A Ratio: 1.12   AV VTI: 25 cm                 LVOT Peak Gradient: 3  MV Peak Gradient:    AV Area (Continuity):3.51 cm2 mmHgLVOT Mean Gradient: 2  2.92 mmHg                                          mmHg                       LVOT VTI: 19.4 cm             Estimated RVSP: 20 mmHg  MV P1/2t: 49 msec                                  Estimated RAP:3 mmHg  MVA by PHT:4.49 cm2  Estimated PASP: 19.81 mmHg   MV E' Septal                                       TR Velocity:205 cm/s  Velocity: 12 cm/s                                  TR Gradient:16.81 mmHg  MV E' Lateral  Velocity: 14 cm/s  MV E/E' septal: 7.12  MV E/E' lateral: 6.1      CT HEAD WO CONTRAST    Result Date: 3/1/2022  EXAMINATION: CT OF THE HEAD WITHOUT CONTRAST  3/1/2022 10:39 am TECHNIQUE: CT of the head was performed without the administration of intravenous contrast. Dose modulation, iterative reconstruction, and/or weight based adjustment of the mA/kV was utilized to reduce the radiation dose to as low as reasonably achievable. COMPARISON: None. HISTORY: ORDERING SYSTEM PROVIDED HISTORY: trouble ambulating, [facility] issue sent from dr Harjinder Mcclain: Reason for exam:->trouble ambulating, [facility] issue sent from dr Rocio Wyman Has a \"code stroke\" or \"stroke alert\" been called? ->No Decision Support Exception - unselect if not a suspected or confirmed emergency medical condition->Emergency Medical Condition (MA) Reason for Exam: trouble ambulating, [facility] issue sent from dr Kevin Molina: BRAIN/VENTRICLES: There is suggestion of a hyperdense right MCA sign. No hemorrhage identified. No midline shift or mass effect. No extra-axial fluid collections ORBITS: The visualized portion of the orbits demonstrate no acute abnormality. SINUSES: The visualized paranasal sinuses and mastoid air cells demonstrate no acute abnormality. SOFT TISSUES/SKULL:  No acute abnormality of the visualized skull or soft tissues. Possible hyperdense right MCA sign, correlation for symptoms of right-sided ischemia suggested. No hemorrhage identified. MRI may be of benefit to further evaluate. Findings communicated immediately to the referring clinical service. XR CHEST PORTABLE    Result Date: 3/1/2022  EXAMINATION: ONE XRAY VIEW OF THE CHEST 3/1/2022 10:05 am COMPARISON: None. HISTORY: ORDERING SYSTEM PROVIDED HISTORY: ataxia TECHNOLOGIST PROVIDED HISTORY: Reason for exam:->ataxia Reason for Exam: ataxia FINDINGS: The cardiomediastinal silhouette is normal in size. The lungs are clear. No pleural effusion or pneumothorax is present.      No acute cardiopulmonary process. ED COURSE & MEDICAL DECISION MAKING     Patient presents as above. Emergent is considered. Patient seen and examined. Patient being sent in by orthopedic surgery for ongoing balance issues, neck pain, the possibility of developing myelopathic or upper motor neuron issue. Patient overall appears well, does state he has some neck issues, has history of cervical radiculopathy. His next dose scale is 0. Otherwise appears well. Did touch base with orthopedic surgery that advised a cervical spine/thoracic spine MRI. In a broad work-up was initiated from the ER standpoint including a CT of the head, EKG, lab work. Patient CT of the head did show concern for possible old infarct on the left MCA area, recommending MRI of the head, this will also go/coincide with MRIs of the cervical and thoracic spine. His lab work otherwise remained well. Secondary to these new findings, his current neurologic abnormalities and being seen by orthopedic spine we will look to admit to the hospitalist team for further evaluation and treatment. He otherwise remained stable while in the ED. Vital signs and nursing notes reviewed during ED course. Clinical  IMPRESSION    1. Ataxia    2. Abnormal CT of the head    3. Disc disease with myelopathy, cervical      Comment: Please note this report has been produced using speech recognition software and may contain errors related to that system including errors in grammar, punctuation, and spelling, as well as words and phrases that may be inappropriate. If there are any questions or concerns please feel free to contact the dictating provider for clarification.          Quyen Myles 411, PA  03/01/22 [id number]

## 2022-03-01 NOTE — ED NOTES
[id number] perfect serve message sent to Dr Shaneka Gonzáles on in pt consult from hospitalist.     Sharri Moreira  03/01/22 [id number]

## 2022-03-01 NOTE — ED NOTES
[facility] paged hospitalist     Power Jury  03/01/22 [phone]  [phone] Le HANCOCK with [facility] group returned call      Power Jury  03/01/22 [phone]

## 2022-03-01 NOTE — ED NOTES
[id number] paged Dr Shantelle Cat. Trey Tracy  03/01/22 [phone] Dr Shantelle Cat returned call.       Trey Tracy  03/01/22 1770

## 2022-03-01 NOTE — PROGRESS NOTES
Informed that the patient wishes to leave against medical advice. The risks (including but not limited to suffering and death) as well as the benefits were explained to the patient. Questions were sought and answered, the patient voiced understanding and accepts these risks. I have encouraged the patient to remain until treatment/evaluation are completed. I have also instructed Aris Maxwell on the importance of follow-up and to return for any worsening or worrisome concerns. Aris Maxwell appears competent to make medical decisions at this time. AMA form signed and placed on the chart.  Patient ambulated off unit independently     Jonathon Norris, CHERYLE

## 2022-03-01 NOTE — H&P
V2.0  History and Physical      Name:  Jesusita Jimenes /Age/Sex: 1980  (35 y.o. male)   MRN & CSN:  [id number] & [id number] Encounter Date/Time: 3/1/2022 1:08 PM EST   Location:  [place] [address] PCP: Aron Whitney Day: 1    Assessment and Plan:   Jesusita Jimenes is a 35 y.o. male with a pmh of hypertension, back pain who presents from [facility] with concern for worsening back pain, worsening right lower extremity weakness. Patient being admitted due to new findings on his head CT with concern for CVA      Strokelike symptoms  Essential hypertension  Right Leg pain  Ataxia    Stroke Alert not called in ED as patient originally presented with complaints of back pain, progressively worsening right leg pain since his lumbar MRI was completed 1 week ago. Also endorses unsteady balance of roughly 2 years. Last Known Well greater than 48 hours   CT head showed possible hyperdense right MCA sign, correlation for symptoms of right-sided ischemia is suggested. No hemorrhage identified. CTA head neck with contrast MRI brain , MRI cervical spine, MRI thoracic spine all pending pending  Echocardiogram ordered  Check lipid panel, hemoglobin A1c in a.m. Aspirin 81 mg po qd, Statin initiated initiated  Consult neurology              Neuro checks q4 hours    Permissive HTN   PRN Labetolol for SBP > 220-hold home amlodipine and lisinopril   Telemetry  monitoring   Physical/ Occupational Therapy consults   Speech therapy consult   [facility]         Chronic Conditions: continue home medication as ordered unless otherwise noted above  Tobacco dependence: Smokes 1 pack/day x 28 years  Patient was counseled on tobacco cessation. All testing  and results reviewed with patient . All questions answered. Patient and family voiced understanding    This patient was seen and examined in conjunction with Dr. Yaniv Allen.  He/She was agreeable with the plan and management as dictated above. Disposition:   Current Living situation: Independent at home  Expected Disposition: Same  Estimated D/C: 2 days    Diet Diet NPO   GI Prophylaxis  [x] PPI,  [] H2 Blocker,  [] Carafate,  [] Diet/Tube Feeds   DVT Prophylaxis [] Lovenox, []  Heparin, [] SCDs, [] Ambulation,  [] NOAC   Code Status Full Code   Surrogate Decision Maker/ POA  none         History from:     patient    History of Present Illness:     Chief Complaint: Stroke-like symptoms  Aidan Zamora is a 35 y.o. male with pmh of hypertension, tobacco abuse who presents from Dr. Samuel Last office where he originally presented to follow-up on the lumbar MRI that was obtained due to complaints of back pain and progressively worsening right leg pain. Patient ALSO complains of ataxia worsening over the past several months. Endorses unsteady balance of roughly the last 1 to 2 years. Patient endorses occasional falls. Denies any significant neck or back pain. Complains of low back pain with radiation down his right leg. States pain is worse with activity and at night. Denies any numbness or tingling in his lower extremities but does state that his left arm will go numb at times which he states he does associate with sleep. Also endorses weakness in his right leg. Denies any loss of bowel or bladder incontinence. Head CT was obtained by the ER provider due to the patient's complaints of ataxia and it showed a hyperdense right MCA lesion suggestive for right MCA stroke. A stroke alert was not called on presentation due to greater than 48 hours from onset of symptoms. Patient is being managed for stroke evaluation and further work-up of his complaints. Patient speech is clear. Denies any headache or blurred vision. Denies any chest pain or shortness of breath. He is not on any antiplatelets or anticoagulants. Patient is a daily smoker, approximately 1 pack/day. Reports current alcohol use.      Review of Systems: Need 10 Elements   Review of Systems   Constitutional: Negative for fatigue. HENT: Negative. Eyes: Negative. Respiratory: Negative for chest tightness and shortness of breath. Cardiovascular: Negative for chest pain and leg swelling. Gastrointestinal: Negative for nausea and vomiting. Endocrine: Negative for cold intolerance and heat intolerance. Genitourinary: Negative for dysuria and hematuria. Musculoskeletal: Positive for back pain. Negative for myalgias. Skin: Negative. Allergic/Immunologic: Negative. Neurological: Negative for dizziness and light-headedness. Hematological: Does not bruise/bleed easily. Psychiatric/Behavioral: Negative for agitation. The patient is not nervous/anxious. Objective:   No intake or output data in the 24 hours ending 03/01/22 1326   Vitals:   Vitals:    03/01/22 0933 03/01/22 1129   BP: (!) 155/105 (!) 141/95   Pulse: 105 98   Resp: 18 18   Temp: 98 °F (36.7 °C)    TempSrc: Oral    SpO2: 98% 99%   Weight: 145 lb (65.8 kg)    Height: 5' 7\" (1.702 m)        Medications Prior to Admission     Prior to Admission medications    Medication Sig Start Date End Date Taking? Authorizing Provider   albuterol sulfate  (90 Base) MCG/ACT inhaler Inhale 2 puffs into the lungs every 6 hours as needed 1/24/22  Yes Historical Provider, MD   naproxen (NAPROSYN) 500 MG tablet TAKE 1 TABLET BY MOUTH TWICE A DAY WITH MEALS 6/5/19   ANGIE Andrade CNP   lisinopril (PRINIVIL;ZESTRIL) 40 MG tablet Take 40 mg by mouth 4/29/19   Historical Provider, MD   amLODIPine (NORVASC) 10 MG tablet Take 10 mg by mouth 4/29/19   Historical Provider, MD   cyclobenzaprine (FLEXERIL) 5 MG tablet Take 1 tablet by mouth 3 times daily as needed for Muscle spasms 5/20/19   ANGIE Andrade CNP       Physical Exam: Need 8 Elements   Physical Exam  Vitals and nursing note reviewed. Constitutional:       General: He is not in acute distress.      Appearance: Normal appearance. HENT:      Head: Normocephalic. Cardiovascular:      Pulses: Normal pulses. Pulmonary:      Effort: Pulmonary effort is normal. No respiratory distress. Breath sounds: No wheezing or rhonchi. Abdominal:      General: Abdomen is flat. Bowel sounds are normal. There is no distension. Musculoskeletal:         General: Normal range of motion. Skin:     General: Skin is warm and dry. Capillary Refill: Capillary refill takes 2 to 3 seconds. Neurological:      General: No focal deficit present. Mental Status: He is alert and oriented to person, place, and time. Cranial Nerves: Cranial nerves are intact. No cranial nerve deficit. Sensory: Sensation is intact. Motor: Motor function is intact. No weakness. Coordination: Coordination is intact. Coordination normal. Finger-Nose-Finger Test normal.      Deep Tendon Reflexes:      Reflex Scores:       Patellar reflexes are 3+ on the left side. Comments: Patient has asymmetric smile which he states is baseline. Past Medical History:   PMHx No past medical history on file. PSHX:  has no past surgical history on file. Allergies: No Known Allergies  Fam HX:  family history is not on file. Soc HX:   Social History     Socioeconomic History    Marital status: Single     Spouse name: Not on file    Number of children: Not on file    Years of education: Not on file    Highest education level: Not on file   Occupational History    Not on file   Tobacco Use    Smoking status: Current Every Day Smoker     Packs/day: 1.00     Types: Cigarettes     Start date: 1/1/1994    Smokeless tobacco: Never Used   Vaping Use    Vaping Use: Never used   Substance and Sexual Activity    Alcohol use:  Yes    Drug use: Never    Sexual activity: Yes   Other Topics Concern    Not on file   Social History Narrative    Not on file     Social Determinants of Health     Financial Resource Strain:     Difficulty of Paying Living Expenses: Not on file   Food Insecurity:     Worried About Running Out of Food in the Last Year: Not on file    Ran Out of Food in the Last Year: Not on file   Transportation Needs:     Lack of Transportation (Medical): Not on file    Lack of Transportation (Non-Medical):  Not on file   Physical Activity:     Days of Exercise per Week: Not on file    Minutes of Exercise per Session: Not on file   Stress:     Feeling of Stress : Not on file   Social Connections:     Frequency of Communication with Friends and Family: Not on file    Frequency of Social Gatherings with Friends and Family: Not on file    Attends Church Services: Not on file    Active Member of Clubs or Organizations: Not on file    Attends Club or Organization Meetings: Not on file    Marital Status: Not on file   Intimate Partner Violence:     Fear of Current or Ex-Partner: Not on file    Emotionally Abused: Not on file    Physically Abused: Not on file    Sexually Abused: Not on file   Housing Stability:     Unable to Pay for Housing in the Last Year: Not on file    Number of Places Lived in the Last Year: Not on file    Unstable Housing in the Last Year: Not on file       Medications:   Medications:    sodium chloride flush  5-40 mL IntraVENous 2 times per day    aspirin  81 mg Oral Daily    Or    aspirin  300 mg Rectal Daily    bisacodyl  5 mg Oral Daily    atorvastatin  80 mg Oral Nightly    [Held by provider] lisinopril  40 mg Oral Daily    [Held by provider] amLODIPine  10 mg Oral Daily    famotidine  20 mg Oral BID      Infusions:    sodium chloride       PRN Meds: albuterol sulfate HFA, 2 puff, Q6H PRN  sodium chloride flush, 5-40 mL, PRN  sodium chloride, 25 mL, PRN  acetaminophen, 650 mg, Q4H PRN   Or  acetaminophen, 650 mg, Q4H PRN  ondansetron, 4 mg, Q8H PRN   Or  ondansetron, 4 mg, Q6H PRN  labetalol, 10 mg, Q10 Min PRN        Labs      CBC:   Recent Labs     03/01/22  1117   WBC 10.8*   HGB visualized portion of the orbits demonstrate no acute abnormality. SINUSES: The visualized paranasal sinuses and mastoid air cells demonstrate no acute abnormality. SOFT TISSUES/SKULL:  No acute abnormality of the visualized skull or soft tissues. Possible hyperdense right MCA sign, correlation for symptoms of right-sided ischemia suggested. No hemorrhage identified. MRI may be of benefit to further evaluate. Findings communicated immediately to the referring clinical service. XR CHEST PORTABLE    Result Date: 3/1/2022  EXAMINATION: ONE XRAY VIEW OF THE CHEST 3/1/2022 10:05 am COMPARISON: None. HISTORY: ORDERING SYSTEM PROVIDED HISTORY: ataxia TECHNOLOGIST PROVIDED HISTORY: Reason for exam:->ataxia Reason for Exam: ataxia FINDINGS: The cardiomediastinal silhouette is normal in size. The lungs are clear. No pleural effusion or pneumothorax is present. No acute cardiopulmonary process. Electronically signed by ANGIE Phan CNP on 3/1/2022 at 1:26 PM          This dictation was created with voice recognition software. While attempts have been made to review the dictation as it is transcribed, on occasion the spoken word can be misinterpreted by the technology leading to omissions or inappropriate words, phrases or sentences.      Electronically signed by ANGIE Phan CNP on 3/1/2022 at 1:26 PM

## 2022-03-01 NOTE — ED NOTES
CHERYLE Lujan down to speak with patient, and patient continues to want to leave. Risks of leaving AMA explained to patient at this time. Patient verbalized understanding. Patient advised to return to ER for any new or worsening symptoms. Patient signed AMA form and ambulated to exit with steady gait.      Melina Shine RN  03/01/22 5920

## 2022-03-01 NOTE — ED NOTES
Patient wishes to leave AMA at this time. Patient states that he is \"tired of coming here and being diagnosed with something else and no one solving the pain that is in his right leg\". CHERYLE Lujan notified.       [facility], RN  03/01/22 4902

## 2022-03-01 NOTE — PROGRESS NOTES
Speech Language Pathology  Facility/Department: [address] [facility], [place]   CLINICAL BEDSIDE SWALLOW EVALUATION    NAME: Jesusita Jimenes  : 1980  MRN: [id number]    IMPRESSIONS: Jesusita Jimenes was referred for a bedside swallow evaluation following admission to [facility] with worsening back pain, right lower extremity weakness, concern for CVA. Per radiologist, head CT reveals \"possible hyperdense right MCA sign. \" No medical hx found in chart. No known history of dysphagia. Pt seen for evaluation seated upright in bed, alert, cooperative. Oral mechanism examination grossly WFL; right labial asymmetry noted, which pt reports is baseline. He was presented with PO trials of thin liquids via cup/straw, puree, and regular solids. Oral stage WFL with intact mastication/AP transit/oral clearance. Pharyngeal stage WFL with intact swallow initiation/laryngeal elevation. No s/s aspiration across all trials. Speech/language screened and judged intact. Recommend initiation of regular diet/thin liquids. No further acute SLP needs identified. ADMISSION DATE: 3/1/2022  ADMITTING DIAGNOSIS: has Stroke-like symptoms on their problem list.  ONSET DATE: this admission    Recent Chest Xray/CT of Chest: see chart    Date of Eval: 3/1/2022  Evaluating Therapist: DAVID Crawley    Current Diet level:  Current Diet : Regular  Current Liquid Diet : Thin      Primary Complaint  Patient Complaint: denies current complaint    Pain:  0    Reason for Referral  Jesusita Jimenes was referred for a bedside swallow evaluation to assess the efficiency of his swallow function, identify signs and symptoms of aspiration and make recommendations regarding safe dietary consistencies, effective compensatory strategies, and safe eating environment.     Impression  Dysphagia Diagnosis: Swallow function appears grossly intact  Dysphagia Outcome Severity Scale: Level 6: Within functional limits/Modified independence     Treatment Plan  Requires SLP Intervention: No  Duration/Frequency of Treatment: N/A  D/C Recommendations: To be determined       Recommended Diet and Intervention  Diet Solids Recommendation: Regular  Liquid Consistency Recommendation: Thin  Recommended Form of Meds: PO          Compensatory Swallowing Strategies       Treatment/Goals  Short-term Goals  Timeframe for Short-term Goals: N/A    General  Chart Reviewed: Yes  Behavior/Cognition: Alert; Cooperative  Respiratory Status: Room air  O2 Device: None (Room air)  Communication Observation: Functional  Follows Directions: Simple  Dentition: Dentures top  Patient Positioning: Upright in bed  Baseline Vocal Quality: Normal  Prior Dysphagia History: none known prior to admission  Consistencies Administered: Reg solid; Dysphagia Pureed (Dysphagia I); Thin - cup; Thin - straw           Vision/Hearing  Vision  Vision: Impaired  Vision Exceptions: Wears glasses at all times  Hearing  Hearing: Within functional limits    Oral Motor Deficits  Oral/Motor  Oral Motor: Within functional limits    Oral Phase Dysfunction  Oral Phase  Oral Phase: WFL     Indicators of Pharyngeal Phase Dysfunction   Pharyngeal Phase  Pharyngeal Phase: WFL    Prognosis  Prognosis  Barriers/Prognosis Comment: N/A  Individuals consulted  Consulted and agree with results and recommendations: Patient    Education  Patient Education: results/recommendations  Patient Education Response: Verbalizes understanding  Safety Devices in place: Yes  Type of devices: All fall risk precautions in place; Left in bed       Therapy Time  SLP Individual Minutes  Time In: 2681  Time Out: [place]  Minutes: Chelsea Gil  3/1/2022 5:40 PM

## 2022-03-03 NOTE — PROGRESS NOTES
Physician Progress Note      PATIENT:               Zach Hernandez  CSN #:                  [id number]  :                       1980  ADMIT DATE:       3/1/2022 11:52 AM  DISCH DATE:        3/1/2022 3:55 PM  RESPONDING  PROVIDER #:        [facility]          QUERY TEXT:    Pt admitted with stroke-like symptoms. Pt noted to have . If possible, please   document in progress notes and discharge summary if you are evaluating and /or   treating any of the following: The medical record reflects the following:  Risk Factors: HTN,  Clinical Indicators: Pt presented to ED with concern for increased back pain   and RLE weakness. CT HEAD showed possible hyperdense right MCA sign,   correlation for symptoms of right-sided ischemia suggested. CTA Head and   Neck showed 50% stenosis of the proximal right cervical ICA. 3/1 Neurosurgery   consultant, Normand Cushing PA, documented, \"Potential right MCA stroke. Concern for cervical vs thoracic myelopathy. \"  3/1 Neurology consultant, Vaughn Engel NP, documented, \"Ataxic gait and right lower extremity weakness   possibly due to radiculopathy v.  Myelopathy v.  Ne  Treatment: Radiology, labs, neuro consult, neurosurgery consult    Thank you,  Naomie Brown, RN  [phone]  Options provided:  -- Stroke-like symptoms due to CVA  -- Stroke-like symptoms due to Cerebral Artery Occlusion/Stenosis (without   Infarction)  -- Stroke-like symptoms due to carotid stenosis  -- Other - I will add my own diagnosis  -- Disagree - Not applicable / Not valid  -- Disagree - Clinically unable to determine / Unknown  -- Refer to Clinical Documentation Reviewer    PROVIDER RESPONSE TEXT:    Stroke-like symptoms are due to CVA.     Query created by: Tammy Henderson on 3/3/2022 3:13 PM      Electronically signed by:  [facility] 3/3/2022 3:20 PM

## 2023-06-02 ENCOUNTER — OFFICE (OUTPATIENT)
Dept: URBAN - METROPOLITAN AREA CLINIC 13 | Facility: CLINIC | Age: 43
End: 2023-06-02

## 2023-06-02 VITALS
SYSTOLIC BLOOD PRESSURE: 130 MMHG | WEIGHT: 141 LBS | HEIGHT: 67 IN | DIASTOLIC BLOOD PRESSURE: 84 MMHG | HEART RATE: 93 BPM

## 2023-06-02 DIAGNOSIS — K62.5 HEMORRHAGE OF ANUS AND RECTUM: ICD-10-CM

## 2023-06-02 DIAGNOSIS — F10.10 ALCOHOL ABUSE, UNCOMPLICATED: ICD-10-CM

## 2023-06-02 PROCEDURE — 99244 OFF/OP CNSLTJ NEW/EST MOD 40: CPT | Performed by: INTERNAL MEDICINE

## 2023-08-21 ENCOUNTER — AMBULATORY SURGICAL CENTER (OUTPATIENT)
Dept: URBAN - METROPOLITAN AREA SURGERY 4 | Facility: SURGERY | Age: 43
End: 2023-08-21